# Patient Record
(demographics unavailable — no encounter records)

---

## 2018-02-20 NOTE — EDM.PDOC
ED HPI GENERAL MEDICAL PROBLEM





- General


Chief Complaint: Respiratory Problem


Stated Complaint: not feeling good hard time breathing [id number]


Time Seen by Provider: 02/20/18 00:10


Source of Information: Reports: Patient, RN, RN Notes Reviewed


History Limitations: Reports: No Limitations





- History of Present Illness


INITIAL COMMENTS - FREE TEXT/NARRATIVE: 


Pt presents to the ER with c/o cough, sore throat, unable to sleep and feeling 

of unable to breathe. She admits to fever, chills, body aches, and runny nose. 

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 

influenza. 


Onset: Gradual


  ** Generalized


Pain Score (Numeric/FACES): 9





- Related Data


 Allergies











Allergy/AdvReac Type Severity Reaction Status Date / Time


 


No Known Allergies Allergy   Verified 02/19/18 22:46











Home Meds: 


 Home Meds





Levothyroxine [Synthroid] 50 mcg PO ACBRK 12/25/13 [History]











Past Medical History





- Past Health History


Medical/Surgical History: Denies Medical/Surgical History


Cardiovascular History: Reports: Hypertension


Endocrine/Metabolic History: Reports: Hyperthyroidism, Obesity/BMI 30+





Social & Family History





- Tobacco Use


Smoking Status *Q: Never Smoker


Second Hand Smoke Exposure: No





- Alcohol Use


Days Per Week of Alcohol Use: 0





- Recreational Drug Use


Recreational Drug Use: No





ED ROS GENERAL





- Review of Systems


Review Of Systems: ROS reveals no pertinent complaints other than HPI.





ED EXAM, GENERAL





- Physical Exam


Exam: See Below


Exam Limited By: No Limitations


General Appearance: Alert, WD/WN, No Apparent Distress


Eye Exam: Bilateral Eye: EOMI, Normal Inspection, PERRL


Ears: Normal External Exam, Hearing Grossly Normal


Nose: Normal Inspection


Throat/Mouth: Normal Inspection, Normal Voice, No Airway Compromise


Head: Atraumatic, Normocephalic


Neck: Normal Inspection, Supple, Non-Tender, Full Range of Motion


Respiratory/Chest: No Respiratory Distress, Lungs Clear, Normal Breath Sounds, 

No Accessory Muscle Use, Chest Non-Tender


Cardiovascular: Normal Peripheral Pulses, Regular Rate, Rhythm, No Edema, No 

Gallop, No JVD, No Murmur, No Rub


Peripheral Pulses: 2+: Radial (L), Radial (R)


GI/Abdominal: Normal Bowel Sounds, Soft, Non-Tender, No Distention


 (Female) Exam: Deferred


Rectal (Female) Exam: Deferred


Back Exam: Normal Inspection, Full Range of Motion, NT


Extremities: Normal Inspection, Normal Range of Motion, Non-Tender, Normal 

Capillary Refill, No Pedal Edema


Neurological: Alert, Oriented, CN II-XII Intact, Normal Cognition, Normal Gait, 

Normal Reflexes, No Motor/Sensory Deficits


Psychiatric: Normal Affect, Normal Mood


Skin Exam: Warm, Dry, Intact, Normal Color, No Rash


Lymphatic: No Adenopathy





Course





- Vital Signs


Last Recorded V/S: 


 Last Vital Signs











Temp  97.8 F   02/19/18 22:41


 


Pulse  107 H  02/19/18 22:41


 


Resp  18   02/19/18 22:41


 


BP  130/116 H  02/19/18 22:41


 


Pulse Ox  99   02/19/18 22:41














- Orders/Labs/Meds


Meds: 


Medications














Discontinued Medications














Generic Name Dose Route Start Last Admin





  Trade Name Freq  PRN Reason Stop Dose Admin


 


Promethazine HCl/Codeine  Confirm  02/20/18 00:23  02/20/18 00:27





  Phenergan With Codeine  Administered  02/20/18 00:24  Not Given





  Dose   





  5 ml   





  .ROUTE   





  .STK-MED ONE   














- Re-Assessments/Exams


Free Text/Narrative Re-Assessment/Exam: 





02/20/18 00:25


Discussed with the patient that her symptoms could be from influenza. She was 

informed that there is a shortage of testing swabs, but that she is already 

past the 48 hour window for Tamiflu. Pt was instructed on supportive care. 





Departure





- Departure


Time of Disposition: 00:23


Disposition: Home, Self-Care 01


Condition: Fair


Clinical Impression: 


Upper respiratory infection


Qualifiers:


 URI type: unspecified viral URI Qualified Code(s): J06.9 - Acute upper 

respiratory infection, unspecified








- Discharge Information


Instructions:  Upper Respiratory Infection, Adult, Easy-to-Read


Referrals: 


Cris Alvarado NP [Primary Care Provider] - 


Forms:  ED Department Discharge


Additional Instructions: 


RX: Phenergan with Codeine


Drink plenty of fluids


Follow up with your primary care facility next week


Rest


Do not return to work until fever free for 24 hours.